# Patient Record
Sex: MALE | Race: OTHER | URBAN - METROPOLITAN AREA
[De-identification: names, ages, dates, MRNs, and addresses within clinical notes are randomized per-mention and may not be internally consistent; named-entity substitution may affect disease eponyms.]

---

## 2020-03-22 ENCOUNTER — EMERGENCY (EMERGENCY)
Facility: HOSPITAL | Age: 25
LOS: 0 days | Discharge: HOME | End: 2020-03-22
Attending: EMERGENCY MEDICINE | Admitting: EMERGENCY MEDICINE
Payer: SELF-PAY

## 2020-03-22 VITALS
HEIGHT: 68.9 IN | TEMPERATURE: 98 F | HEART RATE: 84 BPM | RESPIRATION RATE: 20 BRPM | OXYGEN SATURATION: 98 % | WEIGHT: 198.42 LBS | DIASTOLIC BLOOD PRESSURE: 89 MMHG | SYSTOLIC BLOOD PRESSURE: 134 MMHG

## 2020-03-22 DIAGNOSIS — J02.9 ACUTE PHARYNGITIS, UNSPECIFIED: ICD-10-CM

## 2020-03-22 DIAGNOSIS — H61.22 IMPACTED CERUMEN, LEFT EAR: ICD-10-CM

## 2020-03-22 PROCEDURE — 99283 EMERGENCY DEPT VISIT LOW MDM: CPT

## 2020-03-22 RX ORDER — AMOXICILLIN 250 MG/5ML
1 SUSPENSION, RECONSTITUTED, ORAL (ML) ORAL
Qty: 20 | Refills: 0
Start: 2020-03-22 | End: 2020-03-31

## 2020-03-22 NOTE — ED PROVIDER NOTE - NS ED ROS FT
Review of Systems    Constitutional: (-) fever, (-) chills  Eyes/ENT: (-) blurry vision, (-) epistaxis, (+) sore throat, (+) left ear clogged  Cardiovascular: (-) chest pain, (-) syncope  Respiratory: (-) cough, (-) shortness of breath  Gastrointestinal: (-) pain, (-) nausea, (-) vomiting, (-) diarrhea  Musculoskeletal: (-) neck pain, (-) back pain, (-) joint pain  Integumentary: (-) rash, (-) edema  Neurological: (-) headache, (-) altered mental status

## 2020-03-22 NOTE — ED PROVIDER NOTE - PROGRESS NOTE DETAILS
Pateint's left ear was irrigated with some wax removed. Patient refused further irrigation and walked out of ED. Abx sent to pharmacy

## 2020-03-22 NOTE — ED PROVIDER NOTE - CLINICAL SUMMARY MEDICAL DECISION MAKING FREE TEXT BOX
Patient presents for left sided ear pain that is moderate and throbbing with sore throat.  he denies any headache, neck pain, rashes. he is tolerating po food and fluid. on physical exam the patient is nc/at perrla eomi left ear reveals cerrumen impaction, oropharynx reveals left sided exudates with erythema, cta b/l   rrr s1s2 noted abd-soft nt nd bs+ext from with no focal deficits patient eloped from the dept for an unknown reason

## 2020-03-22 NOTE — ED PROVIDER NOTE - PHYSICAL EXAMINATION
Gen: Alert, NAD, well appearing  Head: NC, AT, PERRL, EOMI, normal lids/conjunctiva  ENT: normal hearing, patent oropharynx with erythema and exudate. Left ear: + cerumen impaction, unable to visualize TM  Neck: +supple, no tenderness/meningismus,  Pulm: Bilateral BS, normal resp effort, no wheeze/stridor/retractions  CV: RRR, no murmer  Abd: soft, NT/ND  Mskel: no edema/erythema/cyanosis  Skin: no rash, warm/dry  Neuro: AAOx3, no sensory/motor deficits

## 2020-03-22 NOTE — ED PROVIDER NOTE - OBJECTIVE STATEMENT
23 yo M c/o left ear clogged and sore throat x 2 days. No fevers, chills, runny nose, cough, or SOB.

## 2020-03-22 NOTE — ED PROVIDER NOTE - ATTENDING CONTRIBUTION TO CARE
I was present for and supervised the key and critical aspects of the procedures performed during the care of the patient. Patient presents for left sided ear pain that is moderate and throbbing with sore throat.  he denies any headache, neck pain, rashes. he is tolerating po food and fluid. on physical exam the patient is nc/at perrla eomi left ear reveals cerrumen impaction, oropharynx reveals left sided exudates with erythema, cta b/l   rrr s1s2 noted abd-soft nt nd bs+ext from with no focal deficits patient eloped from the dept for an unknown reason

## 2020-03-22 NOTE — ED ADULT NURSE NOTE - NSIMPLEMENTINTERV_GEN_ALL_ED
Pt urinated on bedpan, pt buttocks red and soiled with feces, no open sores  Pt washed and made comfortable       Robby Mendoza RN  08/21/18 0125 Implemented All Universal Safety Interventions:  [facility] to call system. Call bell, personal items and telephone within reach. Instruct patient to call for assistance. Room bathroom lighting operational. Non-slip footwear when patient is off stretcher. Physically safe environment: no spills, clutter or unnecessary equipment. Stretcher in lowest position, wheels locked, appropriate side rails in place.